# Patient Record
Sex: MALE | Race: WHITE | NOT HISPANIC OR LATINO | Employment: FULL TIME | URBAN - METROPOLITAN AREA
[De-identification: names, ages, dates, MRNs, and addresses within clinical notes are randomized per-mention and may not be internally consistent; named-entity substitution may affect disease eponyms.]

---

## 2023-12-13 ENCOUNTER — OFFICE VISIT (OUTPATIENT)
Dept: URGENT CARE | Facility: CLINIC | Age: 38
End: 2023-12-13

## 2023-12-13 VITALS
HEIGHT: 72 IN | SYSTOLIC BLOOD PRESSURE: 142 MMHG | DIASTOLIC BLOOD PRESSURE: 93 MMHG | HEART RATE: 96 BPM | TEMPERATURE: 98 F | WEIGHT: 198.44 LBS | OXYGEN SATURATION: 97 % | RESPIRATION RATE: 18 BRPM | BODY MASS INDEX: 26.88 KG/M2

## 2023-12-13 DIAGNOSIS — H93.8X3 EAR PRESSURE, BILATERAL: ICD-10-CM

## 2023-12-13 DIAGNOSIS — R09.82 POST-NASAL DRIP: ICD-10-CM

## 2023-12-13 DIAGNOSIS — R09.81 NASAL CONGESTION: ICD-10-CM

## 2023-12-13 DIAGNOSIS — J06.9 VIRAL URI WITH COUGH: Primary | ICD-10-CM

## 2023-12-13 PROCEDURE — 99203 PR OFFICE/OUTPT VISIT, NEW, LEVL III, 30-44 MIN: ICD-10-PCS | Mod: S$GLB,,, | Performed by: NURSE PRACTITIONER

## 2023-12-13 PROCEDURE — 99203 OFFICE O/P NEW LOW 30 MIN: CPT | Mod: S$GLB,,, | Performed by: NURSE PRACTITIONER

## 2023-12-13 NOTE — PROGRESS NOTES
"Subjective:      Patient ID: Sinan Bennett is a 38 y.o. male.    Vitals:  height is 6' 0.44" (1.84 m) and weight is 90 kg (198 lb 6.6 oz). His oral temperature is 98.3 °F (36.8 °C). His blood pressure is 142/93 (abnormal) and his pulse is 96. His respiration is 18 and oxygen saturation is 97%.     Chief Complaint: Cough    Cough, nasal congestion, ears plugged and no sense of smell. Symptoms started 3 days ago.    38-year-old male presents to clinic with complaints of cough, postnasal drip, nasal congestion, ear fullness, changes in smell x 3 days. He reports flight from [place] today after a connection from [facility]. History positive for covid vaccine x 3 doses    Cough  This is a new problem. Episode onset: 3 days. The problem has been unchanged. The problem occurs constantly. The cough is Non-productive. Associated symptoms include ear congestion, headaches, nasal congestion, postnasal drip and sweats. Pertinent negatives include no chills or fever. Risk factors for lung disease include travel. Treatments tried: dayquil.       Constitution: Negative for chills, sweating, fatigue and fever.   HENT:  Positive for congestion, postnasal drip and sinus pressure.    Respiratory:  Positive for cough.    Skin:  Negative for erythema.   Neurological:  Positive for headaches.      Objective:     Physical Exam   Constitutional: He is oriented to person, place, and time. He appears well-developed.   HENT:   Head: Normocephalic and atraumatic. Head is without abrasion, without contusion and without laceration.   Ears:   Right Ear: External ear normal.   Left Ear: External ear normal.   Nose: Mucosal edema and rhinorrhea present. Right sinus exhibits frontal sinus tenderness. Left sinus exhibits frontal sinus tenderness.   Mouth/Throat: Oropharynx is clear and moist and mucous membranes are normal.   Eyes: Conjunctivae, EOM and lids are normal. Pupils are equal, round, and reactive to light.   Neck: Trachea normal and " phonation normal. Neck supple.   Cardiovascular: Normal rate, regular rhythm and normal heart sounds.   Pulmonary/Chest: Effort normal and breath sounds normal. No stridor. No respiratory distress.   Musculoskeletal: Normal range of motion.         General: Normal range of motion.   Neurological: He is alert and oriented to person, place, and time.   Skin: Skin is warm, dry, intact and no rash. Capillary refill takes less than 2 seconds. No abrasion, No burn, No bruising, No erythema and No ecchymosis   Psychiatric: His speech is normal and behavior is normal. Judgment and thought content normal.   Nursing note and vitals reviewed.      Assessment:     1. Viral URI with cough    2. Ear pressure, bilateral    3. Nasal congestion    4. Post-nasal drip        Plan:       Viral URI with cough    Ear pressure, bilateral    Nasal congestion    Post-nasal drip    Offered covid testing, decline at this time    Patient Instructions   Please drink plenty of fluids.  Please get plenty of rest.  Please return here or go to the Emergency Department for any concerns or worsening of condition.  It is ok to take over the counter plain Allegra or Claritin or Zyrtec.   If you do have Hypertension or palpitations, it is safe to take Coricidin HBP for relief of sinus symptoms.  We recommend you take Flonase (Fluticasone) or another nasally inhaled steroid unless you are already taking one.  Nasal irrigation with a saline spray or Netti Pot like device per their directions is also recommended.  If not allergic, please take over the counter Tylenol (Acetaminophen) and/or Motrin (Ibuprofen) as directed for control of pain and/or fever.  Please follow up with your primary care doctor or specialist as needed.    If you  smoke, please stop smoking.